# Patient Record
Sex: FEMALE | Race: WHITE | NOT HISPANIC OR LATINO | Employment: OTHER | URBAN - METROPOLITAN AREA
[De-identification: names, ages, dates, MRNs, and addresses within clinical notes are randomized per-mention and may not be internally consistent; named-entity substitution may affect disease eponyms.]

---

## 2017-11-13 PROBLEM — M25.512 ACUTE PAIN OF LEFT SHOULDER: Status: ACTIVE | Noted: 2017-11-13

## 2017-11-13 PROBLEM — K21.9 GASTROESOPHAGEAL REFLUX DISEASE: Status: ACTIVE | Noted: 2017-11-13

## 2017-11-13 PROBLEM — D64.9 ANEMIA: Status: ACTIVE | Noted: 2017-11-13

## 2017-12-04 ENCOUNTER — OFFICE VISIT (OUTPATIENT)
Dept: CARDIOLOGY | Facility: MEDICAL CENTER | Age: 62
End: 2017-12-04
Payer: COMMERCIAL

## 2017-12-04 VITALS
OXYGEN SATURATION: 95 % | WEIGHT: 162 LBS | DIASTOLIC BLOOD PRESSURE: 86 MMHG | RESPIRATION RATE: 14 BRPM | BODY MASS INDEX: 28.7 KG/M2 | HEART RATE: 80 BPM | HEIGHT: 63 IN | SYSTOLIC BLOOD PRESSURE: 144 MMHG

## 2017-12-04 DIAGNOSIS — Z01.810 PRE-OPERATIVE CARDIOVASCULAR EXAMINATION: ICD-10-CM

## 2017-12-04 DIAGNOSIS — R94.31 ABNORMAL ELECTROCARDIOGRAM (ECG) (EKG): ICD-10-CM

## 2017-12-04 PROCEDURE — 99204 OFFICE O/P NEW MOD 45 MIN: CPT | Performed by: INTERNAL MEDICINE

## 2017-12-04 RX ORDER — AMOXICILLIN 500 MG/1
CAPSULE ORAL
Refills: 0 | COMMUNITY
Start: 2017-09-22 | End: 2017-12-08

## 2017-12-04 RX ORDER — ONDANSETRON 8 MG/1
TABLET, ORALLY DISINTEGRATING ORAL
Refills: 0 | COMMUNITY
Start: 2017-11-27 | End: 2018-03-09

## 2017-12-04 RX ORDER — CHLORHEXIDINE GLUCONATE ORAL RINSE 1.2 MG/ML
SOLUTION DENTAL
Refills: 0 | COMMUNITY
Start: 2017-09-22 | End: 2017-12-08

## 2017-12-04 RX ORDER — HYDROCODONE BITARTRATE AND ACETAMINOPHEN 5; 325 MG/1; MG/1
TABLET ORAL
Refills: 0 | COMMUNITY
Start: 2017-11-27 | End: 2018-03-09

## 2017-12-04 RX ORDER — IBUPROFEN 200 MG
800 TABLET ORAL PRN
Status: ON HOLD | COMMUNITY
End: 2017-12-14

## 2017-12-04 ASSESSMENT — ENCOUNTER SYMPTOMS
COUGH: 0
WEIGHT LOSS: 1
EYE DISCHARGE: 0
NERVOUS/ANXIOUS: 0
BLURRED VISION: 0
EYE PAIN: 0
BRUISES/BLEEDS EASILY: 0
SPEECH CHANGE: 0
VOMITING: 0
DEPRESSION: 0
HEMOPTYSIS: 0
PALPITATIONS: 0
WHEEZING: 0
FEVER: 0
MYALGIAS: 0
CHILLS: 0
NAUSEA: 0
LOSS OF CONSCIOUSNESS: 0

## 2017-12-04 NOTE — LETTER
[facility]   [address] [place] [address] [address] [id number]  Phone: [phone]  Fax: [phone]              Chey Cifuentes  1955    Encounter Date: 12/4/2017    Lin Brown M.D.          CARDIOLOGY CONSULTATION NOTE:  No notes on file      No Recipients

## 2017-12-04 NOTE — PROGRESS NOTES
Subjective:   Chey Cifuentes is a 61 y.o. female who presents today for pre-op cardiac evaluation due to abnormal EKG    She is seen in consultation at the request of Dr.Steven Case for above issue.    She has no known cardiac history and denies prior HTN, DM or dyslipidemia.  She used to be a  until a year or so ago.  She denies any cardiac symptoms and has not noted any recent change in exercise tolerance.  She walks a couple of miles daily.    She is scheduled to undergo hysterectomy next week.  Pre-op EKG last week by my review showed left lawrence axis with poor R progression and QS in III and aVF with T wave inversion in high lateral leads.    She has had multiple uneventful surgeries over the years. The most recent one was fundoplication a year ago.  We unfortunately are unable to locate any prior EKG for comparison.    CXR reported show borderline cardiomegaly and small hiatal hernia    Past Medical History:   Diagnosis Date   • Anemia    • Environmental allergies    • GERD (gastroesophageal reflux disease)    • GERD (gastroesophageal reflux disease)    • H/O mammogram 07/25/12    [facility] Mammovan form   • HEMORRHOIDS    • Hiatal hernia    • History of mammogram 2012   • Pap smear for cervical cancer screening 2012   • Rectal exam 2012   • Ulcer (CMS-HCC) 11/1993    hospitalized d/t bleeding ulcer 11/1993   • Wears eyeglasses      Past Surgical History:   Procedure Laterality Date   • TUBAL LIGATION  1993   • PRIMARY C SECTION  1978     Family History   Problem Relation Age of Onset   • GI Mother      ulcers   • Lung Disease Mother      respiratory failure   • Cancer Father      melanoma   • Diabetes Sister      T2DM   • Hypertension Other    • Stroke Other    • Thyroid Other    • Arthritis Other      History   Smoking Status   • Never Smoker   Smokeless Tobacco   • Never Used     Allergies   Allergen Reactions   • Codeine      N/V   • Other Drug      SSRI'S, vertigo     • Sumatriptan       Vertigo, nauseated     Outpatient Encounter Prescriptions as of 12/4/2017   Medication Sig Dispense Refill   • ondansetron (ZOFRAN ODT) 8 MG TABLET DISPERSIBLE dissolve 1 tablet ON TONGUE every 8 hours as directed  0   • ibuprofen (MOTRIN) 200 MG Tab Take 200 mg by mouth as needed.     • Multiple Vitamins-Minerals (MULTI COMPLETE PO) Take 1 Tab by mouth every day.     • zaleplon (SONATA) 10 MG capsule Take 1 Cap by mouth at bedtime as needed. 30 Cap 0   • Calcium Carbonate-Vitamin D (CALCIUM 500 + D PO) Take 1,200 mg by mouth every day.     • Magnesium 100 MG TABS Take  by mouth every day.       • ascorbic acid (ASCORBIC ACID) 500 MG TABS Take 500 mg by mouth every day.       • amoxicillin (AMOXIL) 500 MG Cap take 1 capsule by mouth three times a day until finished  0   • chlorhexidine (PERIDEX) 0.12 % Solution Rinse with 1/2 ounce by mouth for 30 seconds then spit out. use twice a day  0   • hydrocodone-acetaminophen (NORCO) 5-325 MG Tab per tablet take 1 to 2 tablets by mouth every 6 hours if needed  0     No facility-administered encounter medications on file as of 12/4/2017.      Review of Systems   Constitutional: Positive for weight loss. Negative for chills and fever.        Able to lose 13 lbs since esophageal surgery   HENT: Negative for congestion.    Eyes: Negative for blurred vision, pain and discharge.   Respiratory: Negative for cough, hemoptysis and wheezing.    Cardiovascular: Negative for chest pain and palpitations.   Gastrointestinal: Negative for nausea and vomiting.        Occasional epigastric pain but better after fundoplication   Musculoskeletal: Negative for joint pain and myalgias.   Skin: Negative for itching and rash.   Neurological: Negative for speech change and loss of consciousness.   Endo/Heme/Allergies: Does not bruise/bleed easily.   Psychiatric/Behavioral: Negative for depression. The patient is not nervous/anxious.    All other systems reviewed and are negative.       Objective:  "  /86   Pulse 80   Resp 14   Ht 1.6 m (5' 3\")   Wt 73.5 kg (162 lb)   LMP 01/01/2002   SpO2 95%   BMI 28.70 kg/m²     Physical Exam   Constitutional: She is oriented to person, place, and time. She appears well-developed. No distress.   HENT:   Mouth/Throat: Mucous membranes are normal.   Eyes: Conjunctivae and EOM are normal.   Neck: No JVD present. No tracheal deviation present. No thyroid mass and no thyromegaly present.   Cardiovascular: Normal rate, regular rhythm and intact distal pulses.    No murmur heard.  Pulmonary/Chest: Effort normal and breath sounds normal. No respiratory distress. She exhibits no tenderness.   Abdominal: Soft. There is no tenderness.   Musculoskeletal: Normal range of motion. She exhibits no edema.   Neurological: She is alert and oriented to person, place, and time. She has normal strength. She displays no tremor.   Skin: Skin is warm and dry. She is not diaphoretic.   Psychiatric: She has a normal mood and affect. Her behavior is normal.   Vitals reviewed.      Assessment:     1. Abnormal electrocardiogram (ECG) (EKG)  ECHOCARDIOGRAM COMP W/O CONT    CANCELED: EKG   2. Pre-operative cardiovascular examination  ECHOCARDIOGRAM COMP W/O CONT    CANCELED: EKG       Medical Decision Making:  Today's Assessment / Status / Plan:     Her EKG is abnormal. This could be from orientation/position of her heart from her hiatal hernia and body habitus. Her BP is borderline, thus she may have some degree of undiagnosed hypertensive heart disease. She however appeared to have good exercise tolerance and no cardiac symptoms.  Her relativeily recent surgery was also uneventful.  I believe that she should be able to proceed with planned hysterectomy with acceptable risk.  To exclude any major cardiac issue, will try to obtain echocardiography in the next few days.  I think that the echocardiography will be reassuring, therefore I do not feel that we need to reschedule her case at this " point.  We will keep you posted about our findings and further recommendations as they become available.   Please also do not hesitate to call for any questions.  Thank you kindly for allowing me to participate in the care of this patient.

## 2017-12-07 ENCOUNTER — TELEPHONE (OUTPATIENT)
Dept: CARDIOLOGY | Facility: MEDICAL CENTER | Age: 62
End: 2017-12-07

## 2017-12-07 NOTE — LETTER
PROCEDURE/SURGERY CLEARANCE FORM      Encounter Date: 12/7/2017    Patient: Chey Cifuentes  YOB: 1955    CARDIOLOGIST:  Dr. Lin Brown    REFERRING DOCTOR:  Dr. Brenden Cosme    PATIENT DOES NOT HAVE A PPM OR AICD    The above patient is cleared from a cardiology standpoint at acceptable risk to have the following procedure/surgery:   hysterectomy                                                               MD Signature   Dr. Lin Brown

## 2017-12-08 ENCOUNTER — TELEPHONE (OUTPATIENT)
Dept: CARDIOLOGY | Facility: MEDICAL CENTER | Age: 62
End: 2017-12-08

## 2017-12-08 NOTE — TELEPHONE ENCOUNTER
"Reviewed Dr. Brown's office note from 12/4/2017:    \"I believe that she should be able to proceed with planned hysterectomy with acceptable risk.  To exclude any major cardiac issue, will try to obtain echocardiography in the next few days.  I think that the echocardiography will be reassuring, therefore I do not feel that we need to reschedule her case at this point.\"    Clearance letter and Dr. Brown's office note faxed to the office of Dr. Cosme at fax #[phone].    Called patient and advised her of the above.    NANCIE RN      "

## 2017-12-08 NOTE — TELEPHONE ENCOUNTER
----- Message from Corrie Nathan sent at 12/7/2017  4:34 PM PST -----  Regarding: status of clearance   Contact: [phone]  HOLLIS/samuel    Pt calling to follow up on clearance needed for next week's hysterectomy surgery by Dr Cosme.  Pt needs to know the status.  Please call Chey at [phone].

## 2017-12-08 NOTE — TELEPHONE ENCOUNTER
MANNY Dalton R.N.             ECHO NL   Should be able to proceed with surgery   Low risk   Think it is scheduled next week   Please inform her PCP and surgeon          =======================================================================    Per chart review, Clearance letter is already faxed yesterday 12/7/17 by Helen ANTON (see notes).     Attempted to call pt to notify Echo result, no answer, left vm to call back

## 2017-12-09 NOTE — TELEPHONE ENCOUNTER
PRIMITIVO Feldman/Terrie       Patient is returning your call from earlier. She can be reached at [phone].        ============================================================================    S/w pt, discussed Echo results per Dr Brown, pt verbalizes understanding

## 2017-12-14 PROBLEM — D64.9 ANEMIA: Status: RESOLVED | Noted: 2017-11-13 | Resolved: 2017-12-14

## 2018-05-10 PROBLEM — R42 VERTIGO: Status: ACTIVE | Noted: 2018-05-10

## 2022-01-24 PROBLEM — R42 VERTIGO: Status: RESOLVED | Noted: 2018-05-10 | Resolved: 2022-01-24
